# Patient Record
Sex: MALE | Race: WHITE | NOT HISPANIC OR LATINO | URBAN - METROPOLITAN AREA
[De-identification: names, ages, dates, MRNs, and addresses within clinical notes are randomized per-mention and may not be internally consistent; named-entity substitution may affect disease eponyms.]

---

## 2018-09-16 ENCOUNTER — EMERGENCY (EMERGENCY)
Facility: HOSPITAL | Age: 18
LOS: 1 days | Discharge: HOME | End: 2018-09-16
Attending: EMERGENCY MEDICINE | Admitting: EMERGENCY MEDICINE
Payer: SELF-PAY

## 2018-09-16 DIAGNOSIS — M79.603 PAIN IN ARM, UNSPECIFIED: ICD-10-CM

## 2018-09-16 PROCEDURE — 99283 EMERGENCY DEPT VISIT LOW MDM: CPT | Mod: 25

## 2018-09-16 PROCEDURE — 99053 MED SERV 10PM-8AM 24 HR FAC: CPT

## 2018-09-17 VITALS
RESPIRATION RATE: 16 BRPM | WEIGHT: 166.89 LBS | SYSTOLIC BLOOD PRESSURE: 120 MMHG | HEIGHT: 65 IN | OXYGEN SATURATION: 97 % | DIASTOLIC BLOOD PRESSURE: 70 MMHG | TEMPERATURE: 99 F | HEART RATE: 88 BPM

## 2018-09-17 PROCEDURE — 99283 EMERGENCY DEPT VISIT LOW MDM: CPT

## 2018-09-17 PROCEDURE — 73060 X-RAY EXAM OF HUMERUS: CPT

## 2018-09-17 PROCEDURE — 73060 X-RAY EXAM OF HUMERUS: CPT | Mod: 26,LT

## 2018-09-17 RX ORDER — IBUPROFEN 200 MG
600 TABLET ORAL ONCE
Qty: 0 | Refills: 0 | Status: COMPLETED | OUTPATIENT
Start: 2018-09-17 | End: 2018-09-17

## 2018-09-17 RX ADMIN — Medication 600 MILLIGRAM(S): at 00:36

## 2018-09-17 RX ADMIN — Medication 600 MILLIGRAM(S): at 00:58

## 2018-09-17 NOTE — ED PROVIDER NOTE - MUSCULOSKELETAL MINIMAL EXAM
no c/t/L spine ttp. LUE: moderate ecchymosis over biceps and medial upper arm c slight ttp over mid biceps. no swelling.  nontender shoulder/elbow/distal arm.  FROM all joints s pain. able to flex elbow against resistance without much discomfort. 2+ rad pulse c nl distal sensation

## 2018-09-17 NOTE — ED PROVIDER NOTE - OBJECTIVE STATEMENT
pt c/o L upper arm biceps area pain , achy, mild for last 2 days assoc c bruising.  pt tripped and fell and put L arm through screen door and landed on L biceps area on metal part of door. denies other injury. pt concerned because bruising was spreading. no numbness/weakness

## 2018-09-17 NOTE — ED ADULT NURSE NOTE - NSIMPLEMENTINTERV_GEN_ALL_ED
Implemented All Universal Safety Interventions:  [place] to call system. Call bell, personal items and telephone within reach. Instruct patient to call for assistance. Room bathroom lighting operational. Non-slip footwear when patient is off stretcher. Physically safe environment: no spills, clutter or unnecessary equipment. Stretcher in lowest position, wheels locked, appropriate side rails in place.

## 2023-09-07 NOTE — ED ADULT TRIAGE NOTE - NS ED TRIAGE AVPU SCALE
Alert-The patient is alert, awake and responds to voice. The patient is oriented to time, place, and person. The triage nurse is able to obtain subjective information. 08-Sep-2023 01:03